# Patient Record
Sex: MALE | Race: WHITE | NOT HISPANIC OR LATINO | Employment: OTHER | URBAN - NONMETROPOLITAN AREA
[De-identification: names, ages, dates, MRNs, and addresses within clinical notes are randomized per-mention and may not be internally consistent; named-entity substitution may affect disease eponyms.]

---

## 2024-02-12 ENCOUNTER — OFFICE VISIT (OUTPATIENT)
Dept: URGENT CARE | Facility: CLINIC | Age: 54
End: 2024-02-12
Payer: COMMERCIAL

## 2024-02-12 VITALS
WEIGHT: 210 LBS | OXYGEN SATURATION: 96 % | DIASTOLIC BLOOD PRESSURE: 74 MMHG | HEIGHT: 68 IN | TEMPERATURE: 98.1 F | HEART RATE: 83 BPM | RESPIRATION RATE: 18 BRPM | BODY MASS INDEX: 31.83 KG/M2 | SYSTOLIC BLOOD PRESSURE: 121 MMHG

## 2024-02-12 DIAGNOSIS — R05.3 CHRONIC COUGH: Primary | ICD-10-CM

## 2024-02-12 DIAGNOSIS — J06.9 UPPER RESPIRATORY TRACT INFECTION, UNSPECIFIED TYPE: ICD-10-CM

## 2024-02-12 DIAGNOSIS — J02.9 ACUTE PHARYNGITIS, UNSPECIFIED ETIOLOGY: ICD-10-CM

## 2024-02-12 PROCEDURE — 99203 OFFICE O/P NEW LOW 30 MIN: CPT

## 2024-02-12 RX ORDER — PREDNISONE 10 MG/1
TABLET ORAL DAILY
Qty: 24 TABLET | Refills: 0 | Status: SHIPPED | OUTPATIENT
Start: 2024-02-12 | End: 2024-02-19

## 2024-02-12 RX ORDER — OMEPRAZOLE 40 MG/1
40 CAPSULE, DELAYED RELEASE ORAL DAILY
COMMUNITY
Start: 2023-08-03 | End: 2024-08-02

## 2024-02-12 RX ORDER — ROSUVASTATIN CALCIUM 10 MG/1
10 TABLET, COATED ORAL
COMMUNITY
Start: 2023-12-12

## 2024-02-12 RX ORDER — TRAMADOL HYDROCHLORIDE 50 MG/1
50 TABLET ORAL EVERY 8 HOURS PRN
COMMUNITY
Start: 2023-12-14 | End: 2024-12-13

## 2024-02-12 RX ORDER — AZITHROMYCIN 250 MG/1
TABLET, FILM COATED ORAL
Qty: 6 TABLET | Refills: 0 | Status: SHIPPED | OUTPATIENT
Start: 2024-02-12 | End: 2024-02-16

## 2024-02-12 RX ORDER — CELECOXIB 200 MG/1
200 CAPSULE ORAL 2 TIMES DAILY
COMMUNITY
Start: 2023-11-07 | End: 2024-11-06

## 2024-02-12 RX ORDER — HYDROCHLOROTHIAZIDE 12.5 MG/1
12.5 CAPSULE, GELATIN COATED ORAL
COMMUNITY
Start: 2023-08-28

## 2024-02-12 RX ORDER — FLUTICASONE PROPIONATE 50 MCG
SPRAY, SUSPENSION (ML) NASAL
COMMUNITY
Start: 2024-01-04

## 2024-02-12 RX ORDER — BENAZEPRIL HYDROCHLORIDE 40 MG/1
TABLET ORAL
COMMUNITY
Start: 2023-09-05

## 2024-02-12 RX ORDER — METHOCARBAMOL 500 MG/1
TABLET, FILM COATED ORAL
COMMUNITY
Start: 2024-01-22

## 2024-02-12 RX ORDER — FEXOFENADINE HCL 180 MG/1
1 TABLET ORAL DAILY
COMMUNITY
Start: 2023-09-05

## 2024-02-12 NOTE — PROGRESS NOTES
[facility]        NAME: Jarrett Heath is a 54 y.o. male  : 1970    MRN: [id number]  DATE: 2024  TIME: 1:10 PM    Assessment and Plan   Chronic cough [R05.3]  1. Chronic cough  azithromycin (ZITHROMAX) 250 mg tablet    predniSONE 10 mg tablet      2. Upper respiratory tract infection, unspecified type  azithromycin (ZITHROMAX) 250 mg tablet      3. Acute pharyngitis, unspecified etiology  azithromycin (ZITHROMAX) 250 mg tablet        Ongoing cough that has been intermittent since 2023.  Was on clindamycin and Levaquin without improvement.  States that prednisone taper back in early January was very helpful.  On exam, lungs clear to auscultation without wheezing, rales, rhonchi.  96% on room air without tachypnea or tachycardia.  No acute distress and sitting up comfortably.  No prior pulmonary history.  Discussed performing a chest x-ray today, declined at this time.  Will start on azithromycin and prednisone taper.  Minimal posterior pharyngeal erythema without tonsillitis, exudates, uvular deviation.  Rapid COVID test was negative at home.  Advise follow-up with family doctor.  Advised to the ER if any symptoms worsen.    Patient Instructions     Take prescribed medication as instructed.  Tylenol or ibuprofen for pain or fever.  Limit ibuprofen use while taking prednisone.  Warm tea with honey, throat lozenges, teaspoon of honey, warm salt gargles to help with sore throat.  May try nasal saline flushes, cool-mist humidifier, Flonase, Astelin to help with congestion.  If no improvement, follow-up with your family doctor.  Go to the emergency room if any symptoms worsen.  Follow up with PCP in 3-5 days.  Proceed to  ER if symptoms worsen.    Chief Complaint     Chief Complaint   Patient presents with    Cough     Cough started in December     Sore Throat     Sore throat started Friday     Generalized Body Aches    Chills     Did take an at home covid test and it was negative           History of Present Illness       54-year-old male presents to the clinic with intermittent cough and upper respiratory congestion/pressure going on since December 2023.  He was on 2 antibiotics (Levaquin and clindamycin) without significant improvement.  He was on prednisone taper back in early January with some improvement.  No prior pulmonary history.  He does not smoke.  He does admit to waking up with a sore throat and some chills/body aches on Friday 2/9.  Had an at-home COVID test that was negative.  States that the symptoms have improved.  Denies any fever, chest pain, shortness of breath, abdominal pain, nausea, vomiting, diarrhea.    Cough  Associated symptoms include chills, rhinorrhea and a sore throat. Pertinent negatives include no ear pain, fever, shortness of breath or wheezing.   Sore Throat   Associated symptoms include congestion and coughing. Pertinent negatives include no ear discharge, ear pain or shortness of breath.   Generalized Body Aches  Associated symptoms include congestion, rhinorrhea, a sore throat and coughing. Pertinent negatives include no ear discharge, ear pain, fatigue, fever, shortness of breath or wheezing.       Review of Systems   Review of Systems   Constitutional:  Positive for chills. Negative for fatigue and fever.   HENT:  Positive for congestion, rhinorrhea and sore throat. Negative for ear discharge and ear pain.    Eyes: Negative.    Respiratory:  Positive for cough. Negative for shortness of breath and wheezing.    Cardiovascular: Negative.    Gastrointestinal: Negative.    Musculoskeletal: Negative.    Neurological: Negative.          Current Medications       Current Outpatient Medications:     azithromycin (ZITHROMAX) 250 mg tablet, Take 2 tablets today then 1 tablet daily x 4 days, Disp: 6 tablet, Rfl: 0    benazepril (LOTENSIN) 40 MG tablet, TAKE 1 TABLET BY MOUTH EVERY DAY. INDICATIONS:HIGH BLOOD PRESSURE, Disp: , Rfl:     celecoxib (CeleBREX) 200 mg  "capsule, Take 200 mg by mouth 2 (two) times a day, Disp: , Rfl:     fexofenadine (ALLEGRA) 180 MG tablet, Take 1 tablet by mouth daily, Disp: , Rfl:     fluticasone (FLONASE) 50 mcg/act nasal spray, SPRAY 2 SPRAYS INTO EACH NOSTRIL EVERY DAY, Disp: , Rfl:     hydroCHLOROthiazide 12.5 mg capsule, Take 12.5 mg by mouth, Disp: , Rfl:     methocarbamol (ROBAXIN) 500 mg tablet, TAKE 1 TABLET BY MOUTH FOUR TIMES A DAY AS NEEDED FOR MUSCLE SPASM, Disp: , Rfl:     omeprazole (PriLOSEC) 40 MG capsule, Take 40 mg by mouth daily, Disp: , Rfl:     predniSONE 10 mg tablet, Take 5 tablets (50 mg total) by mouth daily for 2 days, THEN 4 tablets (40 mg total) daily for 2 days, THEN 3 tablets (30 mg total) daily for 1 day, THEN 2 tablets (20 mg total) daily for 1 day, THEN 1 tablet (10 mg total) daily for 1 day., Disp: 24 tablet, Rfl: 0    rosuvastatin (CRESTOR) 10 MG tablet, Take 10 mg by mouth, Disp: , Rfl:     traMADol (ULTRAM) 50 mg tablet, Take 50 mg by mouth every 8 (eight) hours as needed, Disp: , Rfl:     Current Allergies     Allergies as of 02/12/2024 - Reviewed 02/12/2024   Allergen Reaction Noted    [place] Swelling 06/02/2022    [place] Swelling 06/02/2022    Metoprolol Rash 10/17/2018            The following portions of the patient's history were reviewed and updated as appropriate: allergies, current medications, past family history, past medical history, past social history, past surgical history and problem list.     Past Medical History:   Diagnosis Date    Allergic     GERD (gastroesophageal reflux disease)     Hypertension        History reviewed. No pertinent surgical history.    History reviewed. No pertinent family history.      Medications have been verified.        Objective   /74   Pulse 83   Temp 98.1 °F (36.7 °C)   Resp 18   Ht 5' 8\" (1.727 m)   Wt 95.3 kg (210 lb)   SpO2 96%   BMI 31.93 kg/m²        Physical Exam     Physical Exam  Constitutional:       General: He is awake. He is not in " acute distress.     Appearance: Normal appearance. He is not ill-appearing, toxic-appearing or diaphoretic.   HENT:      Head: Normocephalic and atraumatic.      Right Ear: Tympanic membrane, ear canal and external ear normal.      Left Ear: Tympanic membrane, ear canal and external ear normal.      Nose: Congestion present.      Mouth/Throat:      Lips: Pink.      Mouth: Mucous membranes are moist.      Pharynx: Oropharynx is clear. Uvula midline. Posterior oropharyngeal erythema (mild with postnasal drip) present. No pharyngeal swelling, oropharyngeal exudate or uvula swelling.      Tonsils: No tonsillar exudate or tonsillar abscesses.   Eyes:      Extraocular Movements: Extraocular movements intact.      Conjunctiva/sclera: Conjunctivae normal.      Pupils: Pupils are equal, round, and reactive to light.   Cardiovascular:      Rate and Rhythm: Normal rate and regular rhythm.      Pulses: Normal pulses.      Heart sounds: Normal heart sounds.   Pulmonary:      Effort: Pulmonary effort is normal. No tachypnea, bradypnea, accessory muscle usage, prolonged expiration, respiratory distress or retractions.      Breath sounds: Normal breath sounds and air entry. No stridor, decreased air movement or transmitted upper airway sounds. No decreased breath sounds, wheezing, rhonchi or rales.   Chest:      Chest wall: No tenderness.   Musculoskeletal:      Cervical back: Normal range of motion and neck supple. No tenderness.   Lymphadenopathy:      Cervical: No cervical adenopathy.   Skin:     General: Skin is warm and dry.      Capillary Refill: Capillary refill takes less than 2 seconds.      Findings: No rash.   Neurological:      General: No focal deficit present.      Mental Status: He is alert, oriented to person, place, and time and easily aroused. Mental status is at baseline.   Psychiatric:         Mood and Affect: Mood normal.         Behavior: Behavior normal. Behavior is cooperative.

## 2024-02-12 NOTE — PATIENT INSTRUCTIONS
Take prescribed medication as instructed.  Tylenol or ibuprofen for pain or fever.  Limit ibuprofen use while taking prednisone.  Warm tea with honey, throat lozenges, teaspoon of honey, warm salt gargles to help with sore throat.  May try nasal saline flushes, cool-mist humidifier, Flonase, Astelin to help with congestion.  If no improvement, follow-up with your family doctor.  Go to the emergency room if any symptoms worsen.  Follow up with PCP in 3-5 days.  Proceed to  ER if symptoms worsen.  Upper Respiratory Infection   WHAT YOU NEED TO KNOW:   An upper respiratory infection is also called a cold. It can affect your nose, throat, ears, and sinuses. Cold symptoms are usually worst for the first 3 to 5 days. Most people get better in 7 to 14 days. You may continue to cough for 2 to 3 weeks. Colds are caused by viruses and do not get better with antibiotics.  DISCHARGE INSTRUCTIONS:   Call your local emergency number (911 in the ) if:   You have chest pain or trouble breathing.      Return to the emergency department if:   You have a fever over 102ºF (39ºC).      Call your doctor if:   You have a low fever.    Your sore throat gets worse or you see white or yellow spots in your throat.    Your symptoms get worse after 3 to 5 days or are not better in 14 days.    You have a rash anywhere on your skin.    You have large, tender lumps in your neck.    You have thick, green, or yellow drainage from your nose.    You cough up thick yellow, green, or bloody mucus.    You have a bad earache.    You have questions or concerns about your condition or care.    Medicines:  You may need any of the following:  Decongestants  help reduce nasal congestion and help you breathe more easily. If you take decongestant pills, they may make you feel restless or cause problems with your sleep. Do not use decongestant sprays for more than a few days.    Cough suppressants  help reduce coughing. Ask your healthcare provider which type  of cough medicine is best for you.     NSAIDs , such as ibuprofen, help decrease swelling, pain, and fever. NSAIDs can cause stomach bleeding or kidney problems in certain people. If you take blood thinner medicine, always ask your healthcare provider if NSAIDs are safe for you. Always read the medicine label and follow directions.    Acetaminophen  decreases pain and fever. It is available without a doctor's order. Ask how much to take and how often to take it. Follow directions. Read the labels of all other medicines you are using to see if they also contain acetaminophen, or ask your doctor or pharmacist. Acetaminophen can cause liver damage if not taken correctly.    Take your medicine as directed.  Contact your healthcare provider if you think your medicine is not helping or if you have side effects. Tell your provider if you are allergic to any medicine. Keep a list of the medicines, vitamins, and herbs you take. Include the amounts, and when and why you take them. Bring the list or the pill bottles to follow-up visits. Carry your medicine list with you in case of an emergency.    Self-care:   Rest as much as possible.  Slowly start to do more each day.    Drink more liquids as directed.  Liquids will help thin and loosen mucus so you can cough it up. Liquids will also help prevent dehydration. Liquids that help prevent dehydration include water, fruit juice, and broth. Do not drink liquids that contain caffeine. Caffeine can increase your risk for dehydration. Ask your healthcare provider how much liquid to drink each day.    Soothe a sore throat.  Gargle with warm salt water. Make salt water by dissolving ¼ teaspoon salt in 1 cup warm water. You may also suck on hard candy or throat lozenges. You may use a sore throat spray.    Use a humidifier or vaporizer.  Use a cool mist humidifier or a vaporizer to increase air moisture in your home. This may make it easier for you to breathe and help decrease your  cough.    Use saline nasal drops as directed.  These help relieve congestion.    Apply petroleum-based jelly around the outside of your nostrils.  This can decrease irritation from blowing your nose.    Do not smoke.  Nicotine and other chemicals in cigarettes and cigars can make your symptoms worse. They can also cause infections such as bronchitis or pneumonia. Ask your healthcare provider for information if you currently smoke and need help to quit. E-cigarettes or smokeless tobacco still contain nicotine. Talk to your healthcare provider before you use these products.    Prevent a cold:   Wash your hands often.  Use soap and water every time you wash your hands. Rub your soapy hands together, lacing your fingers. Use the fingers of one hand to scrub under the nails of the other hand. Wash for at least 20 seconds. Rinse with warm, running water for several seconds. Then dry your hands. Use hand  gel if soap and water are not available. Do not touch your eyes or mouth without washing your hands first.         Cover a sneeze or cough.  Use a tissue that covers your mouth and nose. Put the used tissue in the trash right away. Use the bend of your arm if a tissue is not available. Wash your hands well with soap and water or use a hand . Do not stand close to anyone who is sneezing or coughing.    Try to stay away from others while you are sick.  This is especially important during the first 2 to 3 days when the virus is more easily spread. Wait until a fever, cough, or other symptoms are gone before you return to work or other regular activities.    Do not share items while you are sick.  This includes food, drinks, eating utensils, and dishes.    Follow up with your doctor as directed:  Write down your questions so you remember to ask them during your visits.  © Copyright [employer] 2023 Information is for End User's use only and may not be sold, redistributed or otherwise used for commercial  purposes.  The above information is an  only. It is not intended as medical advice for individual conditions or treatments. Talk to your doctor, nurse or pharmacist before following any medical regimen to see if it is safe and effective for you.  Chronic Cough   WHAT YOU NEED TO KNOW:   A chronic cough is a cough that lasts more than 4 weeks in children or 8 weeks in adults.  DISCHARGE INSTRUCTIONS:   Call your local emergency number (911 in the ) for any of the following:   You cough up blood.     You faint when you cough.    You have trouble breathing.    Call your doctor if:   You have new or worsening symptoms.     You have severe pain when you take a deep breath.    You become very tired after a coughing fit.    You have trouble sleeping because of the coughing.    You have questions or concerns about your condition or care.    Medicines:   Medicines  may be needed to stop your cough. You may also need medicine to treat allergies or acid reflux, or decrease swelling in your airways. If you have a respiratory infection, you may need antibiotics. Medicine may be given as a pill or to use in an inhaler.    Take your medicine as directed.  Contact your healthcare provider if you think your medicine is not helping or if you have side effects. Tell your provider if you are allergic to any medicine. Keep a list of the medicines, vitamins, and herbs you take. Include the amounts, and when and why you take them. Bring the list or the pill bottles to follow-up visits. Carry your medicine list with you in case of an emergency.    Self-care:   Prevent acid reflex.  Acid reflux can make your chronic cough worse. Raise your head and upper back when you sleep. Place 2 or more pillows behind your head or sleep in a recliner. Do not lie down for at least 1 hour after you eat. Do not have foods or drinks that increase heartburn. Ask your healthcare provider for other ways to prevent acid reflux.         Do not  smoke.  Encourage your adolescent child not to smoke. Nicotine and other chemicals in cigarettes and cigars can cause lung damage. They can also make your cough worse. Ask your healthcare provider for information if you currently smoke and need help to quit. E-cigarettes or smokeless tobacco still contain nicotine. Talk to your healthcare provider before you use these products.    Stay away from secondhand smoke.  Do not let people smoke in your car, home, or near your child. Do not stand near someone that is smoking. This includes anyone that is smoking an E-cigarrete.    Avoid anything that triggers your allergies or irritates your throat.  Allergens and irritants can make your chronic cough worse. Allergens may include dust mites, pollen, pet dander, or mold. Wear a mask if you work around pollutants or irritants. Ask your healthcare provider for more ways to decrease your exposure to allergens or irritants.    Drink plenty of liquids as directed.  Liquids may help relieve throat discomfort that causes you to cough. Add honey to tea or hot water to help ease your throat pain. Ask how much liquid to drink each day and which liquids are best for you.    Follow up with your healthcare provider as directed:  You may need to return for more tests. Your healthcare provider may refer to you other specialists. Write down your questions so you remember to ask them during your visits.  © Copyright [employer] 2023 Information is for End User's use only and may not be sold, redistributed or otherwise used for commercial purposes.  The above information is an  only. It is not intended as medical advice for individual conditions or treatments. Talk to your doctor, nurse or pharmacist before following any medical regimen to see if it is safe and effective for you.

## 2024-05-07 DIAGNOSIS — R05.3 CHRONIC COUGH: ICD-10-CM

## 2024-08-05 RX ORDER — PREDNISONE 10 MG/1
TABLET ORAL
Qty: 24 TABLET | Refills: 0 | OUTPATIENT
Start: 2024-08-05